# Patient Record
Sex: MALE | Race: BLACK OR AFRICAN AMERICAN | NOT HISPANIC OR LATINO | URBAN - METROPOLITAN AREA
[De-identification: names, ages, dates, MRNs, and addresses within clinical notes are randomized per-mention and may not be internally consistent; named-entity substitution may affect disease eponyms.]

---
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
## 2023-03-28 NOTE — ED PROVIDER NOTE - CROS ED RESP ALL NEG
LVM advising patient results Medication:   Requested Prescriptions     Pending Prescriptions Disp Refills    amLODIPine (NORVASC) 5 MG tablet 30 tablet 5     Sig: TAKE 1 TABLET BY MOUTH ONE TIME A DAY    atorvastatin (LIPITOR) 40 MG tablet 30 tablet 5     Sig: TAKE ONE TABLET BY MOUTH NIGHTLY    lisinopril (PRINIVIL;ZESTRIL) 40 MG tablet 30 tablet 5     Sig: TAKE 1 TABLET BY MOUTH ONE TIME A DAY    metFORMIN (GLUCOPHAGE) 500 MG tablet 60 tablet 5     Sig: TAKE 1 TABLET BY MOUTH 2 TIMES A DAY WITH MEALS    warfarin (COUMADIN) 5 MG tablet 135 tablet 0     Sig: TAKE 1 AND 1/2 TABLETS BY MOUTH DAILY        Last Filled:      Patient Phone Number: [phone] (home)     Last appt: 2/23/2021   Next appt: 8/24/2021    Last OARRS: No flowsheet data found. negative - no cough

## 2024-05-09 ENCOUNTER — OUTPATIENT (OUTPATIENT)
Dept: OUTPATIENT SERVICES | Age: 19
LOS: 1 days | End: 2024-05-09
Payer: COMMERCIAL

## 2024-05-09 VITALS
DIASTOLIC BLOOD PRESSURE: 72 MMHG | HEIGHT: 78 IN | RESPIRATION RATE: 18 BRPM | OXYGEN SATURATION: 100 % | HEART RATE: 50 BPM | SYSTOLIC BLOOD PRESSURE: 116 MMHG | WEIGHT: 240.3 LBS | TEMPERATURE: 98 F

## 2024-05-09 DIAGNOSIS — S02.609A FRACTURE OF MANDIBLE, UNSPECIFIED, INITIAL ENCOUNTER FOR CLOSED FRACTURE: ICD-10-CM

## 2024-05-09 DIAGNOSIS — J45.909 UNSPECIFIED ASTHMA, UNCOMPLICATED: ICD-10-CM

## 2024-05-09 DIAGNOSIS — Z98.890 OTHER SPECIFIED POSTPROCEDURAL STATES: Chronic | ICD-10-CM

## 2024-05-09 DIAGNOSIS — R00.1 BRADYCARDIA, UNSPECIFIED: ICD-10-CM

## 2024-05-09 PROCEDURE — 93010 ELECTROCARDIOGRAM REPORT: CPT

## 2024-05-09 RX ORDER — ALBUTEROL 90 UG/1
2 AEROSOL, METERED ORAL
Refills: 0 | DISCHARGE

## 2024-05-09 NOTE — H&P PST ADULT - REASON FOR ADMISSION
Pre surgical testing evaluation in preparation for a scheduled open reduction internal fixation of a mandibular fracture on 5/10/2024 with Dr. Sol at [facility]

## 2024-05-09 NOTE — H&P PST ADULT - COMMENTS
FHx:  Mother: No PMH,    Father: multiple surgeries no complications  Siblings: 17 yo brother- no pmh, no psh  MGM:  from Pancreatic [place], no psh   MGF: orthopedic surgery  PGM: no pmh, no psh  PGF: no pmh, no psh  Reports no family history of anesthesia complications or prolonged bleeding

## 2024-05-09 NOTE — H&P PST ADULT - PROBLEM SELECTOR PLAN 2
Pt with last use of albuterol one month ago     Pre operative recommendations  Pt instructed to start albuterol 2 day prior to procedure 3 x a day

## 2024-05-09 NOTE — H&P PST ADULT - ENMT COMMENTS
mandibular Pt with history of a mandibular fracture who is now scheduled for a scheduled open reduction internal fixation of a mandibular fracture on 5/10/2024 with Dr. [place] [facility]. Pt scheduled to see Dr. Sol today 5/9/2024.

## 2024-05-09 NOTE — H&P PST ADULT - PROBLEM SELECTOR PLAN 1
Pt is scheduled for a open reduction internal fixation of a mandibular fracture on 5/10/2024 with Dr. Sol at [facility]

## 2024-05-09 NOTE — H&P PST ADULT - NSICDXPASTSURGICALHX_GEN_ALL_CORE_FT
PAST SURGICAL HISTORY:  No significant past surgical history      PAST SURGICAL HISTORY:  S/P ACL surgery

## 2024-05-09 NOTE — H&P PST ADULT - NSANTHTIREDRD_ENT_A_CORE
TELE RN OPENING NOTES



RECEIVED PATIENT IN BED, A/O X2. STABLE ON RA. DENIES ANY PAIN OR DISCOMFORT AT THIS TIME. 
EXTERNAL CARDIAC MONITOR READING CONTROLLED A-FIB 70'S AT THIS TIME. IV POTASSIUM IN D5W 
RUNNING @75 MLS/HR. NPO SINCE MIDNIGHT. PENDING COLONOSCOPY AND EGD TODAY. CONSENTS ARE 
SIGNED. SAFETY PRECAUTIONS IN PLACE. WILL CONTINUE TO MONITOR. No

## 2024-05-09 NOTE — H&P PST ADULT - RESPIRATORY AND THORAX COMMENTS
Pt with history of asthma managed by PCP with albuterol use PRN. Denies any use within the last 6 months. Pt with history of asthma managed by PCP with albuterol use PRN. Pt reports last use of albuterol 1 month ago.

## 2024-05-09 NOTE — H&P PST ADULT - HISTORY OF PRESENT ILLNESS
19 yo male with a history of a mandibular fracture who is now scheduled for a open reduction internal fixation of a mandibular fracture on 5/10/2024 with Dr. Sol at [facility]

## 2024-05-09 NOTE — H&P PST ADULT - ASSESSMENT
17 yo male presents to PST wit no signs or symptoms of infection or illness  Parent Instructed and aware to notify surgeon if s/s of infection or illness develop prior to DOS

## 2024-05-09 NOTE — H&P PST ADULT - NSANTHOSAYNRD_GEN_A_CORE
No. ELISEO screening performed.  STOP BANG Legend: 0-2 = LOW Risk; 3-4 = INTERMEDIATE Risk; 5-8 = HIGH Risk